# Patient Record
Sex: FEMALE | Race: WHITE | Employment: UNEMPLOYED | URBAN - METROPOLITAN AREA
[De-identification: names, ages, dates, MRNs, and addresses within clinical notes are randomized per-mention and may not be internally consistent; named-entity substitution may affect disease eponyms.]

---

## 2024-10-10 ENCOUNTER — OFFICE VISIT (OUTPATIENT)
Dept: URGENT CARE | Age: 69
End: 2024-10-10

## 2024-10-10 VITALS
BODY MASS INDEX: 39.27 KG/M2 | HEART RATE: 75 BPM | TEMPERATURE: 98 F | WEIGHT: 208 LBS | SYSTOLIC BLOOD PRESSURE: 124 MMHG | DIASTOLIC BLOOD PRESSURE: 78 MMHG | HEIGHT: 61 IN | OXYGEN SATURATION: 94 %

## 2024-10-10 DIAGNOSIS — J06.9 VIRAL URI: ICD-10-CM

## 2024-10-10 DIAGNOSIS — J02.9 SORE THROAT: ICD-10-CM

## 2024-10-10 DIAGNOSIS — J02.9 ACUTE VIRAL PHARYNGITIS: Primary | ICD-10-CM

## 2024-10-10 LAB — STREPTOCOCCUS A RNA: NEGATIVE

## 2024-10-10 RX ORDER — ATORVASTATIN CALCIUM 10 MG/1
10 TABLET, FILM COATED ORAL DAILY
COMMUNITY
Start: 2024-03-20

## 2024-10-10 RX ORDER — AMLODIPINE BESYLATE 5 MG/1
5 TABLET ORAL DAILY
COMMUNITY
Start: 2024-05-29

## 2024-10-10 RX ORDER — DEXTROMETHORPHAN HYDROBROMIDE AND PROMETHAZINE HYDROCHLORIDE 15; 6.25 MG/5ML; MG/5ML
5 SYRUP ORAL 4 TIMES DAILY PRN
Qty: 100 ML | Refills: 0 | Status: SHIPPED | OUTPATIENT
Start: 2024-10-10 | End: 2024-10-15

## 2024-10-10 RX ORDER — OLMESARTAN MEDOXOMIL 40 MG/1
40 TABLET ORAL DAILY
COMMUNITY
Start: 2024-01-17

## 2024-10-10 NOTE — PATIENT INSTRUCTIONS
Strep was negative.     This is likely viral.     Alternate Tylenol and Motrin every 4 hours as directed as needed for pain and/or fever.     Cool liquids and fluids such as ice cream and popsicles can help.     Over-the-counter chloraseptic spray can provided brief periods of relief.      Follow-up with primary care or return if not improved.

## 2024-10-10 NOTE — PROGRESS NOTES
Julienne Goldman (:  1955) is a 69 y.o. female,  here for evaluation of the following chief complaint(s): Pharyngitis (Started last night. Possible fever. Pt states very hard to swallow. )      Julienne Goldman is a New patient.   Last Urgent Care Visit: Visit date not found  I have reviewed the patient's medications; see Medication Reconciliation.    ASSESSMENT/PLAN:  Diagnosis:     ICD-10-CM    1. Acute viral pharyngitis  J02.9       2. Sore throat  J02.9 POCT Rapid Strep A DNA      3. Viral URI  J06.9 promethazine-dextromethorphan (PROMETHAZINE-DM) 6.25-15 MG/5ML syrup             Medical Decision Making:    Patient was seen at Urgent Care today for sore throat since yesterday evening. No other URI symptoms. No known strep exposure.     On exam there is mild generalized erythema to the oropharynx with no significant tonsillar hypertrophy or exudate.     Strep test was obtained and was negative.   I have low clinical concern for Covid and/or Flu.    This is likely viral only. Prednisone considered but pt has glaucoma and corticosteroids can increase IOP, therefore these are avoided.     Advised symptomatic care.  Prescribed: Promethazine-DM for cough/congestion, mainly at night.      Patient was discharged home with follow-up and return precautions.     Patient's initial blood pressure was elevated greater than 120/80. BP was rechecked prior to discharge and is below 140/90. Therefore, referral to PCP is not required at this time.     Orders Placed This Encounter   Medications    promethazine-dextromethorphan (PROMETHAZINE-DM) 6.25-15 MG/5ML syrup     Sig: Take 5 mLs by mouth 4 times daily as needed for Cough     Dispense:  100 mL     Refill:  0       Results:  Results for orders placed or performed in visit on 10/10/24   POCT Rapid Strep A DNA   Result Value Ref Range    Streptococcus A RNA Negative           SUBJECTIVE/OBJECTIVE:  HPI:   This is a 69 y.o. female that presents today with complaint of: sore throat x

## 2024-10-14 ENCOUNTER — OFFICE VISIT (OUTPATIENT)
Dept: URGENT CARE | Age: 69
End: 2024-10-14

## 2024-10-14 VITALS
SYSTOLIC BLOOD PRESSURE: 126 MMHG | DIASTOLIC BLOOD PRESSURE: 86 MMHG | TEMPERATURE: 98 F | WEIGHT: 208 LBS | OXYGEN SATURATION: 93 % | HEIGHT: 61 IN | BODY MASS INDEX: 39.27 KG/M2 | HEART RATE: 72 BPM

## 2024-10-14 DIAGNOSIS — R09.81 NASAL CONGESTION: Primary | ICD-10-CM

## 2024-10-14 DIAGNOSIS — J32.9 SINUSITIS, UNSPECIFIED CHRONICITY, UNSPECIFIED LOCATION: ICD-10-CM

## 2024-10-14 LAB
Lab: NORMAL
PERFORMING INSTRUMENT: NORMAL
QC PASS/FAIL: NORMAL
RSV RAPID ANTIGEN: NEGATIVE
SARS-COV-2, POC: NORMAL

## 2024-10-14 RX ORDER — FLUTICASONE PROPIONATE 50 MCG
1 SPRAY, SUSPENSION (ML) NASAL DAILY
Qty: 16 G | Refills: 0 | Status: SHIPPED | OUTPATIENT
Start: 2024-10-14

## 2024-10-14 RX ORDER — BENZONATATE 200 MG/1
200 CAPSULE ORAL 3 TIMES DAILY PRN
Qty: 21 CAPSULE | Refills: 0 | Status: SHIPPED | OUTPATIENT
Start: 2024-10-14 | End: 2024-10-21

## 2024-10-14 NOTE — PROGRESS NOTES
seen here 4 days ago 10/10/24. At that time she had more of a sore throat. Strep was negative and she was treated symptomatically. The sore throat did improve and resolve.   However, the last 2-3 days she has now developed increased cough, sinus congestion and nasal congestion.     Cough has been mainly dry. Worse at night.   Confirms bilateral sinus congestion and pressure. Purulent rhinorrhea reported.   No fever.     No success with nasal rinse, humidifiers, OTC meds.   Does not smoke.     Vitals:    10/14/24 1300   BP: 139/88   Pulse: 72   Temp: 98 °F (36.7 °C)   TempSrc: Oral   SpO2: 93%   Weight: 94.3 kg (208 lb)   Height: 1.549 m (5' 1\")           Physical Exam  Constitutional:       Appearance: Normal appearance. NAD. Appears well. Non-toxic.   HENT:      Head: Atraumatic.      Mouth/Throat: Oropharynx: Oropharynx with mild generalized erythema and edema. . Tonsils: No tonsillar hypertrophy or exudate. Uvula normal and midline. Airway patent.      Mouth: Mucus membranes moist.      Sinuses: Mild tenderness to the maxillary sinuses.   Ears:     Right Ear: External ear normal.       Left Ear: External ear normal.    Eyes:      Conjunctiva/sclera: Conjunctivae normal. No redness or discharge. PERRL. EOMs intact.   Cardiovascular:      Rate and Rhythm: Normal rate and regular rhythm.   Pulmonary:      Comments: Lungs clear to auscultation bilaterally. No wheezing rhonchi or rales. Pt speaking in full sentences with normal respiratory effort.   Abdominal:      General: There is no distension.   Musculoskeletal:      Cervical back: Normal range of motion and neck supple.   Skin:     General: Skin is warm and dry.   Neurological:      Mental Status: He is alert and oriented to person, place, and time.     An electronic signature was used to authenticate this note.    --Daniel Gutierrez PA-C

## 2024-10-14 NOTE — PATIENT INSTRUCTIONS
COVID and RSV were negative.     Take any prescribed medications as directed.     You may additionally take over-the-counter antihistamine, decongestants.     Alternate Tylenol and Motrin every 4 hours as directed as needed for chills, fever.     Follow-up with your primary care physician or return if not improved.     Go to the ER if you develop significant shortness of breath, difficulty breathing, high fever, chest pain or other major concerning symptoms.